# Patient Record
Sex: MALE | Race: WHITE | HISPANIC OR LATINO | Employment: FULL TIME | URBAN - METROPOLITAN AREA
[De-identification: names, ages, dates, MRNs, and addresses within clinical notes are randomized per-mention and may not be internally consistent; named-entity substitution may affect disease eponyms.]

---

## 2018-03-10 ENCOUNTER — HOSPITAL ENCOUNTER (OUTPATIENT)
Facility: HOSPITAL | Age: 53
Discharge: HOME | End: 2018-03-11
Attending: EMERGENCY MEDICINE | Admitting: SURGERY

## 2018-03-10 DIAGNOSIS — K57.92 DIVERTICULITIS: Primary | ICD-10-CM

## 2018-03-10 LAB
ALBUMIN SERPL BCP-MCNC: 3.1 G/DL
ALP SERPL-CCNC: 64 U/L
ALT SERPL W/O P-5'-P-CCNC: 11 U/L
ANION GAP SERPL CALC-SCNC: 6 MMOL/L
AST SERPL-CCNC: 12 U/L
BASOPHILS # BLD AUTO: 0.04 K/UL
BASOPHILS NFR BLD: 0.4 %
BILIRUB SERPL-MCNC: 0.2 MG/DL
BILIRUB UR QL STRIP: NEGATIVE
BUN SERPL-MCNC: 12 MG/DL
CALCIUM SERPL-MCNC: 8.8 MG/DL
CHLORIDE SERPL-SCNC: 109 MMOL/L
CLARITY UR: CLEAR
CO2 SERPL-SCNC: 23 MMOL/L
COLOR UR: YELLOW
CREAT SERPL-MCNC: 0.7 MG/DL
DIFFERENTIAL METHOD: ABNORMAL
EOSINOPHIL # BLD AUTO: 0.2 K/UL
EOSINOPHIL NFR BLD: 2.1 %
ERYTHROCYTE [DISTWIDTH] IN BLOOD BY AUTOMATED COUNT: 14 %
EST. GFR  (AFRICAN AMERICAN): >60 ML/MIN/1.73 M^2
EST. GFR  (NON AFRICAN AMERICAN): >60 ML/MIN/1.73 M^2
GLUCOSE SERPL-MCNC: 81 MG/DL
GLUCOSE UR QL STRIP: NEGATIVE
HCT VFR BLD AUTO: 40.4 %
HGB BLD-MCNC: 13.1 G/DL
HGB UR QL STRIP: NEGATIVE
KETONES UR QL STRIP: NEGATIVE
LEUKOCYTE ESTERASE UR QL STRIP: NEGATIVE
LIPASE SERPL-CCNC: 18 U/L
LYMPHOCYTES # BLD AUTO: 2.3 K/UL
LYMPHOCYTES NFR BLD: 20.5 %
MCH RBC QN AUTO: 26.6 PG
MCHC RBC AUTO-ENTMCNC: 32.4 G/DL
MCV RBC AUTO: 82 FL
MONOCYTES # BLD AUTO: 0.8 K/UL
MONOCYTES NFR BLD: 7.6 %
NEUTROPHILS # BLD AUTO: 7.7 K/UL
NEUTROPHILS NFR BLD: 69.2 %
NITRITE UR QL STRIP: NEGATIVE
PH UR STRIP: 6 [PH] (ref 5–8)
PLATELET # BLD AUTO: 260 K/UL
PMV BLD AUTO: 9.5 FL
POTASSIUM SERPL-SCNC: 4.1 MMOL/L
PROT SERPL-MCNC: 6.4 G/DL
PROT UR QL STRIP: NEGATIVE
RBC # BLD AUTO: 4.93 M/UL
SODIUM SERPL-SCNC: 138 MMOL/L
SP GR UR STRIP: 1.02 (ref 1–1.03)
URN SPEC COLLECT METH UR: NORMAL
UROBILINOGEN UR STRIP-ACNC: NEGATIVE EU/DL
WBC # BLD AUTO: 11.09 K/UL

## 2018-03-10 PROCEDURE — G0378 HOSPITAL OBSERVATION PER HR: HCPCS

## 2018-03-10 PROCEDURE — 99284 EMERGENCY DEPT VISIT MOD MDM: CPT | Mod: 25

## 2018-03-10 PROCEDURE — 80053 COMPREHEN METABOLIC PANEL: CPT

## 2018-03-10 PROCEDURE — 25000003 PHARM REV CODE 250: Performed by: SURGERY

## 2018-03-10 PROCEDURE — S0030 INJECTION, METRONIDAZOLE: HCPCS | Performed by: SURGERY

## 2018-03-10 PROCEDURE — 25500020 PHARM REV CODE 255: Performed by: EMERGENCY MEDICINE

## 2018-03-10 PROCEDURE — S5010 5% DEXTROSE AND 0.45% SALINE: HCPCS | Performed by: EMERGENCY MEDICINE

## 2018-03-10 PROCEDURE — 85025 COMPLETE CBC W/AUTO DIFF WBC: CPT

## 2018-03-10 PROCEDURE — 96374 THER/PROPH/DIAG INJ IV PUSH: CPT

## 2018-03-10 PROCEDURE — 83690 ASSAY OF LIPASE: CPT

## 2018-03-10 PROCEDURE — 63600175 PHARM REV CODE 636 W HCPCS: Performed by: EMERGENCY MEDICINE

## 2018-03-10 PROCEDURE — 81003 URINALYSIS AUTO W/O SCOPE: CPT

## 2018-03-10 PROCEDURE — 25000003 PHARM REV CODE 250: Performed by: EMERGENCY MEDICINE

## 2018-03-10 RX ORDER — HYDROMORPHONE HYDROCHLORIDE 2 MG/ML
0.5 INJECTION, SOLUTION INTRAMUSCULAR; INTRAVENOUS; SUBCUTANEOUS EVERY 4 HOURS PRN
Status: DISCONTINUED | OUTPATIENT
Start: 2018-03-10 | End: 2018-03-11 | Stop reason: HOSPADM

## 2018-03-10 RX ORDER — DEXTROSE MONOHYDRATE AND SODIUM CHLORIDE 5; .45 G/100ML; G/100ML
INJECTION, SOLUTION INTRAVENOUS CONTINUOUS
Status: DISCONTINUED | OUTPATIENT
Start: 2018-03-10 | End: 2018-03-11

## 2018-03-10 RX ORDER — CIPROFLOXACIN 2 MG/ML
400 INJECTION, SOLUTION INTRAVENOUS
Status: DISCONTINUED | OUTPATIENT
Start: 2018-03-10 | End: 2018-03-11 | Stop reason: HOSPADM

## 2018-03-10 RX ORDER — METRONIDAZOLE 500 MG/100ML
500 INJECTION, SOLUTION INTRAVENOUS
Status: DISCONTINUED | OUTPATIENT
Start: 2018-03-10 | End: 2018-03-11 | Stop reason: HOSPADM

## 2018-03-10 RX ORDER — ONDANSETRON 2 MG/ML
4 INJECTION INTRAMUSCULAR; INTRAVENOUS EVERY 8 HOURS PRN
Status: DISCONTINUED | OUTPATIENT
Start: 2018-03-10 | End: 2018-03-11 | Stop reason: HOSPADM

## 2018-03-10 RX ORDER — HYDROMORPHONE HYDROCHLORIDE 2 MG/ML
0.5 INJECTION, SOLUTION INTRAMUSCULAR; INTRAVENOUS; SUBCUTANEOUS
Status: COMPLETED | OUTPATIENT
Start: 2018-03-10 | End: 2018-03-10

## 2018-03-10 RX ORDER — METRONIDAZOLE 500 MG/100ML
500 INJECTION, SOLUTION INTRAVENOUS
Status: DISCONTINUED | OUTPATIENT
Start: 2018-03-10 | End: 2018-03-10

## 2018-03-10 RX ORDER — CIPROFLOXACIN 2 MG/ML
400 INJECTION, SOLUTION INTRAVENOUS
Status: DISCONTINUED | OUTPATIENT
Start: 2018-03-10 | End: 2018-03-10

## 2018-03-10 RX ADMIN — HYDROMORPHONE HYDROCHLORIDE 0.5 MG: 2 INJECTION, SOLUTION INTRAMUSCULAR; INTRAVENOUS; SUBCUTANEOUS at 01:03

## 2018-03-10 RX ADMIN — IOHEXOL 75 ML: 350 INJECTION, SOLUTION INTRAVENOUS at 02:03

## 2018-03-10 RX ADMIN — SODIUM CHLORIDE 1000 ML: 0.9 INJECTION, SOLUTION INTRAVENOUS at 05:03

## 2018-03-10 RX ADMIN — METRONIDAZOLE 500 MG: 500 INJECTION, SOLUTION INTRAVENOUS at 07:03

## 2018-03-10 RX ADMIN — CIPROFLOXACIN 400 MG: 2 INJECTION, SOLUTION INTRAVENOUS at 06:03

## 2018-03-10 RX ADMIN — DEXTROSE AND SODIUM CHLORIDE: 5; .45 INJECTION, SOLUTION INTRAVENOUS at 06:03

## 2018-03-10 NOTE — ED PROVIDER NOTES
Encounter Date: 3/10/2018    SCRIBE #1 NOTE: I, Amy Hanna, am scribing for, and in the presence of,  Dr. Mcduffie. I have scribed the entire note.       History     Chief Complaint   Patient presents with    Abdominal Pain     lower abdominal pain for 2 weeks.  PAtient is constipated      Time patient was seen by the provider: 12:12 PM      The patient is a 52 y.o. male with no significant PMHx who presents to the ED with a complaint of intermittent lower Abd pain for the past 2 weeks. He rates his pain as an 8/10 and says the pain is localized to his lower Abd and does not radiate.  No hx of similar pain.  No hx of diverticulitis or kidney stone.  He did not take any medications for pain relief. He reports [place] 3 doses of unknown [place]x from a [place]. He denies any N/V/D, fever, chills, or dysuria. He states his last normal BM was last night. No rectal bleeding.  He reports no other problems at this time.           Review of patient's allergies indicates:   Allergen Reactions    Penicillins      History reviewed. No pertinent past medical history.  Past Surgical History:   Procedure Laterality Date    hemmrhoids       History reviewed. No pertinent family history.  Social History   Substance Use Topics    Smoking status: Current Every Day Smoker    Smokeless tobacco: Not on file    Alcohol use Yes      Comment: social     Review of Systems   Constitutional: Negative for activity change, appetite change, chills and fever.   HENT: Negative for congestion and sore throat.    Respiratory: Negative for chest tightness and shortness of breath.    Cardiovascular: Negative for chest pain and palpitations.   Gastrointestinal: Positive for abdominal pain. Negative for abdominal distention, constipation, diarrhea, nausea and vomiting.   Genitourinary: Negative for difficulty urinating, dysuria, flank pain, frequency and hematuria.   Musculoskeletal: Negative for back pain.   Skin: Negative for pallor  and rash.   Neurological: Negative for dizziness and weakness.   Hematological: Does not bruise/bleed easily.   All other systems reviewed and are negative.      Physical Exam     Initial Vitals [03/10/18 1205]   BP Pulse Resp Temp SpO2   118/70 67 15 98.9 °F (37.2 °C) 99 %      MAP       86         Physical Exam    Nursing note and vitals reviewed.  Constitutional: He appears well-developed and well-nourished. He is not diaphoretic. No distress.   HENT:   Head: Normocephalic and atraumatic.   Mouth/Throat: Oropharynx is clear and moist.   Eyes: Conjunctivae are normal. No scleral icterus.   Neck: Normal range of motion. Neck supple.   Cardiovascular: Normal rate, regular rhythm and normal heart sounds. Exam reveals no gallop and no friction rub.    No murmur heard.  Pulmonary/Chest: Breath sounds normal. No respiratory distress. He has no wheezes. He has no rhonchi. He has no rales.   Abdominal: Soft. Bowel sounds are normal. He exhibits no distension and no ascites. There is tenderness. There is guarding. There is no rebound.       RLQ tenderness    Musculoskeletal: Normal range of motion. He exhibits no edema.   Neurological: He is alert and oriented to person, place, and time.   Skin: Skin is warm and dry. No rash noted. No pallor.   Psychiatric: He has a normal mood and affect. His behavior is normal.         ED Course   Procedures  Labs Reviewed   CBC W/ AUTO DIFFERENTIAL   COMPREHENSIVE METABOLIC PANEL   LIPASE   URINALYSIS          X-Rays:   Independently Interpreted Readings:   Other Readings:  I have visualized all imaging for this patient, radiology has done the interpretation.      Imaging Results          CT Abdomen Pelvis With Contrast (Final result)  Result time 03/10/18 15:11:26    Final result by Christ Rivers MD (03/10/18 15:11:26)                 Impression:         Inflammatory process in the right lower quadrant/right hemipelvis with a complex fluid collection.  This collection is  immediately adjacent to the sigmoid colon and findings are favored to represent acute diverticulitis/colitis with associated abscess formation.  The appendix is not definitively visualized, however the cecum in the expected location of the appendix is slightly separate from this area suggesting the etiology is not likely from the appendix although acute appendicitis is not entirely excluded.    Left renal simple cyst.      Electronically signed by: RAVI LOCKE MD  Date:     03/10/18  Time:    15:11              Narrative:    Time of Procedure: 03/10/18 14:38:29  Accession # [id number]    CT OF THE ABDOMEN & PELVIS WITH IV CONTRAST:       Technique: CT examination of the abdomen and pelvis was obtained using 5 mm axial images after the administration of 100 cc of Omnipaque 350 IV and PO Contrast.  Portal venous and delayed phase images were obtained.  Coronal and sagittal reformats were obtained.    Comparison: None.     Clinical Indication:  Abdominal pain.    Results:    The visualized portions of the lungs, heart, and pericardium demonstrate no significant abnormalities.    The liver appears normal in size and contour with no focal hepatic masses.  The gallbladder demonstrates no evidence of gallbladder wall thickening, dilatation, or surrounding inflammatory changes.  No evidence of radiopaque cholelithiasis.No evidence of intra or extrahepatic biliary ductal dilation. Portal vein is patent.  The spleen, stomach, duodenum, pancreas, and adrenal glands demonstrate no significant abnormalities.    The kidneys are normal in size and appearance.  Hypodensity in the midpole of the left kidney consistent with a simple cyst.The ureters are normal in course and caliber with no evidence of nephrolithiasis, dilatation, or surrounding inflammatory changes.  The urinary bladder demonstrates no significant abnormalities.Prostate is not enlarged.    Small bowel is normal in caliber.  The appendix is not definitively  visualized.  There is an inflammatory process in the right lower quadrant in the pelvis.  There is a hypoattenuating collection immediately adjacent to the sigmoid colon measuring 2.5 x 3.2 x 2.4 CM concerning for a complex collection such as an abscess.  The etiology of this collection is favored to represent diverticulitis/colitis rather than appendicitis.  There is a loop of distal ileum in this region, however this is likely separate from the expected area of the appendix.      No evidence of obstruction.  The aorta tapers appropriately.    No lymphadenopathy.    The osseous structures demonstrate no evidence of fractures, dislocations, or osseous destructive processes.                              Medical Decision Making:   Initial Assessment:   53 yo male with intermittent lower abd pain for 2 weeks  Differential Diagnosis:   GERD, intestinal spasm, gastroenteritis, gastritis, ulcer, cholecystitis, gallstones, pancreatitis, ileus, small bowel obstruction, appendicitis, constipation, intestinal gas pain, UTI, kidney stone    Clinical Tests:   Lab Tests: Ordered and Reviewed  The following lab test(s) were unremarkable: CBC, CMP, Lipase and Urinalysis  Radiological Study: Ordered and Reviewed  ED Management:   Pt CT a/p is + for diverticulitis with associated abscess.  Pt labs are wnl.  He is feeling much better after pain meds.  Pt remains NPO.    I consulted Dr. Pichardo (general Surgeon) and discussed the case with her. Says she will admit patient with complicated diverticulitis. I have placed skeleton orders. I have discussed the patient's lab results and CT scan findings with him in detail with the assistance of an . Patient agrees to be admitted.  Other:   I have discussed this case with another health care provider.                      Clinical Impression:     1. Diverticulitis          Disposition:   Disposition: Admitted  Condition: Stable       I, Dr. Tosha Mcduffie, personally performed  the services described in this documentation. All medical record entries made by the scribe were at my direction and in my presence.  I have reviewed the chart and agree that the record reflects my personal performance and is accurate and complete. Tosha Mcduffie MD.  2:03 PM 03/10/2018                     Tosha Mcduffie MD  03/10/18 6905

## 2018-03-10 NOTE — ED NOTES
Pt c/o pain across his lower abdomen for the past 12 days. Lower abdomen is tender to palpation. Pt is guarding. Upper abdomen is soft, nontender. Denies any urinary complaints, nausea, or vomiting.

## 2018-03-11 VITALS
RESPIRATION RATE: 19 BRPM | OXYGEN SATURATION: 97 % | HEIGHT: 66 IN | TEMPERATURE: 98 F | DIASTOLIC BLOOD PRESSURE: 65 MMHG | HEART RATE: 69 BPM | WEIGHT: 157.88 LBS | BODY MASS INDEX: 25.37 KG/M2 | SYSTOLIC BLOOD PRESSURE: 112 MMHG

## 2018-03-11 LAB
ANION GAP SERPL CALC-SCNC: 7 MMOL/L
BUN SERPL-MCNC: 7 MG/DL
CALCIUM SERPL-MCNC: 8.5 MG/DL
CHLORIDE SERPL-SCNC: 106 MMOL/L
CO2 SERPL-SCNC: 24 MMOL/L
CREAT SERPL-MCNC: 0.8 MG/DL
CRP SERPL-MCNC: 71.26 MG/L
EST. GFR  (AFRICAN AMERICAN): >60 ML/MIN/1.73 M^2
EST. GFR  (NON AFRICAN AMERICAN): >60 ML/MIN/1.73 M^2
GLUCOSE SERPL-MCNC: 103 MG/DL
POTASSIUM SERPL-SCNC: 3.7 MMOL/L
PREALB SERPL-MCNC: 10 MG/DL
SODIUM SERPL-SCNC: 137 MMOL/L

## 2018-03-11 PROCEDURE — 84134 ASSAY OF PREALBUMIN: CPT

## 2018-03-11 PROCEDURE — S5010 5% DEXTROSE AND 0.45% SALINE: HCPCS | Performed by: EMERGENCY MEDICINE

## 2018-03-11 PROCEDURE — 86141 C-REACTIVE PROTEIN HS: CPT

## 2018-03-11 PROCEDURE — 63600175 PHARM REV CODE 636 W HCPCS: Performed by: EMERGENCY MEDICINE

## 2018-03-11 PROCEDURE — S0030 INJECTION, METRONIDAZOLE: HCPCS | Performed by: SURGERY

## 2018-03-11 PROCEDURE — 25000003 PHARM REV CODE 250: Performed by: SURGERY

## 2018-03-11 PROCEDURE — G0378 HOSPITAL OBSERVATION PER HR: HCPCS

## 2018-03-11 PROCEDURE — 99219 PR INITIAL OBSERVATION CARE,LEVL II: CPT | Mod: ,,, | Performed by: SURGERY

## 2018-03-11 PROCEDURE — 36415 COLL VENOUS BLD VENIPUNCTURE: CPT

## 2018-03-11 PROCEDURE — 94761 N-INVAS EAR/PLS OXIMETRY MLT: CPT

## 2018-03-11 PROCEDURE — 80048 BASIC METABOLIC PNL TOTAL CA: CPT

## 2018-03-11 PROCEDURE — 25000003 PHARM REV CODE 250: Performed by: EMERGENCY MEDICINE

## 2018-03-11 RX ORDER — CIPROFLOXACIN 500 MG/1
500 TABLET ORAL EVERY 12 HOURS
Qty: 28 TABLET | Refills: 0 | Status: SHIPPED | OUTPATIENT
Start: 2018-03-11 | End: 2018-03-25

## 2018-03-11 RX ORDER — DEXTROSE MONOHYDRATE, SODIUM CHLORIDE, AND POTASSIUM CHLORIDE 50; 1.49; 4.5 G/1000ML; G/1000ML; G/1000ML
INJECTION, SOLUTION INTRAVENOUS CONTINUOUS
Status: DISCONTINUED | OUTPATIENT
Start: 2018-03-11 | End: 2018-03-11 | Stop reason: HOSPADM

## 2018-03-11 RX ORDER — METRONIDAZOLE 500 MG/1
500 TABLET ORAL EVERY 8 HOURS
Qty: 42 TABLET | Refills: 0 | Status: SHIPPED | OUTPATIENT
Start: 2018-03-11 | End: 2018-03-25

## 2018-03-11 RX ADMIN — DEXTROSE AND SODIUM CHLORIDE: 5; .45 INJECTION, SOLUTION INTRAVENOUS at 06:03

## 2018-03-11 RX ADMIN — DEXTROSE MONOHYDRATE, SODIUM CHLORIDE, AND POTASSIUM CHLORIDE: 50; 4.5; 1.49 INJECTION, SOLUTION INTRAVENOUS at 09:03

## 2018-03-11 RX ADMIN — METRONIDAZOLE 500 MG: 500 INJECTION, SOLUTION INTRAVENOUS at 12:03

## 2018-03-11 RX ADMIN — METRONIDAZOLE 500 MG: 500 INJECTION, SOLUTION INTRAVENOUS at 04:03

## 2018-03-11 RX ADMIN — CIPROFLOXACIN 400 MG: 2 INJECTION, SOLUTION INTRAVENOUS at 06:03

## 2018-03-11 NOTE — H&P
[facility]  General Surgery  History & Physical    Patient Name: Pio Fatima  MRN: [id number]  Admission Date: 3/10/2018  Attending Physician: Usha Pichardo MD   Primary Care Provider: Primary Doctor No    Patient information was obtained from patient and ER records.     Subjective:     Chief Complaint/Reason for Admission: abd pain    History of Present Illness: Patient interviewed with   He has had 2 weeks of abdominal pain, yesterday 8/10 today it is 4/10 in intensity. It is lower abdomen without radiation. This is associated with constipation, denies n/v/fever. He has had no prior episodes. He has not had a colonoscopy. His last bm was today, small and no blood present.   Receiving IV cipro and flagyl.       No current facility-administered medications on file prior to encounter.      No current outpatient prescriptions on file prior to encounter.       Review of patient's allergies indicates:   Allergen Reactions    Penicillins        History reviewed. No pertinent past medical history.  Past Surgical History:   Procedure Laterality Date    hemmrhoids       Family History     None        Social History Main Topics    Smoking status: Current Every Day Smoker    Smokeless tobacco: Not on file    Alcohol use Yes      Comment: social    Drug use: No    Sexual activity: Not on file     Review of Systems   Constitutional: Negative for chills and fever.   HENT: Negative for congestion and sore throat.    Eyes: Negative for photophobia and visual disturbance.   Respiratory: Negative for cough and shortness of breath.    Cardiovascular: Negative for chest pain and palpitations.   Gastrointestinal: Positive for abdominal pain and constipation.   Genitourinary: Negative for dysuria, frequency and hematuria.   Musculoskeletal: Negative for back pain and gait problem.   Skin: Negative for rash and wound.   Neurological: Negative for seizures and headaches.   Hematological:  Negative for adenopathy. Does not bruise/bleed easily.   Psychiatric/Behavioral: Negative for sleep disturbance. The patient is not nervous/anxious.      Objective:     Vital Signs (Most Recent):  Temp: 98.6 °F (37 °C) (03/11/18 0355)  Pulse: 67 (03/11/18 0731)  Resp: 17 (03/11/18 0731)  BP: 108/64 (03/11/18 0355)  SpO2: 97 % (03/11/18 0731) Vital Signs (24h Range):  Temp:  [98.2 °F (36.8 °C)-99.2 °F (37.3 °C)] 98.6 °F (37 °C)  Pulse:  [52-67] 67  Resp:  [15-18] 17  SpO2:  [97 %-99 %] 97 %  BP: (101-118)/(50-70) 108/64     Weight: 71.6 kg (157 lb 13.6 oz)  Body mass index is 25.48 kg/m².    Physical Exam   Constitutional: He is oriented to person, place, and time. He appears well-developed and well-nourished. No distress.   HENT:   Head: Normocephalic and atraumatic.   Eyes: Conjunctivae and EOM are normal. No scleral icterus.   Neck: Normal range of motion.   Cardiovascular: Normal rate and intact distal pulses.    Pulmonary/Chest: Effort normal. No stridor. No respiratory distress.   Abdominal: Soft. He exhibits distension. There is no tenderness.   nontender   Musculoskeletal: Normal range of motion. He exhibits no edema or tenderness.   Neurological: He is alert and oriented to person, place, and time.   Skin: No rash noted. He is not diaphoretic. No pallor.   Psychiatric: He has a normal mood and affect. His behavior is normal.       Significant Labs:  CBC:   Recent Labs  Lab 03/10/18  1221   WBC 11.09   RBC 4.93   HGB 13.1*   HCT 40.4      MCV 82   MCH 26.6*   MCHC 32.4     BMP:   Recent Labs  Lab 03/10/18  1221   GLU 81      K 4.1      CO2 23   BUN 12   CREATININE 0.7   CALCIUM 8.8       Significant Diagnostics:  I have reviewed and interpreted all pertinent imaging results/findings within the past 24 hours.     Imaging Results          CT Abdomen Pelvis With Contrast (Final result)  Result time 03/10/18 15:11:26    Final result by Christ Rivers MD (03/10/18 15:11:26)                  Impression:         Inflammatory process in the right lower quadrant/right hemipelvis with a complex fluid collection.  This collection is immediately adjacent to the sigmoid colon and findings are favored to represent acute diverticulitis/colitis with associated abscess formation.  The appendix is not definitively visualized, however the cecum in the expected location of the appendix is slightly separate from this area suggesting the etiology is not likely from the appendix although acute appendicitis is not entirely excluded.    Left renal simple cyst.      Electronically signed by: RAVI LOCKE MD  Date:     03/10/18  Time:    15:11              Narrative:    Time of Procedure: 03/10/18 14:38:29  Accession # [id number]    CT OF THE ABDOMEN & PELVIS WITH IV CONTRAST:       Technique: CT examination of the abdomen and pelvis was obtained using 5 mm axial images after the administration of 100 cc of Omnipaque 350 IV and PO Contrast.  Portal venous and delayed phase images were obtained.  Coronal and sagittal reformats were obtained.    Comparison: None.     Clinical Indication:  Abdominal pain.    Results:    The visualized portions of the lungs, heart, and pericardium demonstrate no significant abnormalities.    The liver appears normal in size and contour with no focal hepatic masses.  The gallbladder demonstrates no evidence of gallbladder wall thickening, dilatation, or surrounding inflammatory changes.  No evidence of radiopaque cholelithiasis.No evidence of intra or extrahepatic biliary ductal dilation. Portal vein is patent.  The spleen, stomach, duodenum, pancreas, and adrenal glands demonstrate no significant abnormalities.    The kidneys are normal in size and appearance.  Hypodensity in the midpole of the left kidney consistent with a simple cyst.The ureters are normal in course and caliber with no evidence of nephrolithiasis, dilatation, or surrounding inflammatory changes.  The urinary bladder  demonstrates no significant abnormalities.Prostate is not enlarged.    Small bowel is normal in caliber.  The appendix is not definitively visualized.  There is an inflammatory process in the right lower quadrant in the pelvis.  There is a hypoattenuating collection immediately adjacent to the sigmoid colon measuring 2.5 x 3.2 x 2.4 CM concerning for a complex collection such as an abscess.  The etiology of this collection is favored to represent diverticulitis/colitis rather than appendicitis.  There is a loop of distal ileum in this region, however this is likely separate from the expected area of the appendix.      No evidence of obstruction.  The aorta tapers appropriately.    No lymphadenopathy.    The osseous structures demonstrate no evidence of fractures, dislocations, or osseous destructive processes.                                Assessment/Plan:     * Diverticulitis    Small abscess < 4 cm, no sepsis. Abdomen nontender. Small bm today  Will plan clears and iv cipro flagyl today, he wants to go home. Will dc this evening with 2 weeks cipro and flagyl.   Dvt ppx sqh          VTE Risk Mitigation     None          Usha Pichardo MD  General Surgery  [facility]

## 2018-03-11 NOTE — NURSING
Arrived to unit in wheelchair, AAOX4, no complaints of any pain, no distress noted, Plan of care explained to patient and friend, all questions and concerns addressed at this time.

## 2018-03-11 NOTE — HPI
Patient interviewed with   He has had 2 weeks of abdominal pain, yesterday 8/10 today it is 4/10 in intensity. It is lower abdomen without radiation. This is associated with constipation, denies n/v/fever. He has had no prior episodes. He has not had a colonoscopy. His last bm was today, small and no blood present.   Receiving IV cipro and flagyl.

## 2018-03-11 NOTE — ASSESSMENT & PLAN NOTE
Small abscess < 4 cm, no sepsis. Abdomen nontender. Small bm today  Will plan clears and iv cipro flagyl today, he wants to go home. Will dc this evening with 2 weeks cipro and flagyl.   Dvt ppx sqh

## 2018-03-11 NOTE — SUBJECTIVE & OBJECTIVE
No current facility-administered medications on file prior to encounter.      No current outpatient prescriptions on file prior to encounter.       Review of patient's allergies indicates:   Allergen Reactions    Penicillins        History reviewed. No pertinent past medical history.  Past Surgical History:   Procedure Laterality Date    hemmrhoids       Family History     None        Social History Main Topics    Smoking status: Current Every Day Smoker    Smokeless tobacco: Not on file    Alcohol use Yes      Comment: social    Drug use: No    Sexual activity: Not on file     Review of Systems   Constitutional: Negative for chills and fever.   HENT: Negative for congestion and sore throat.    Eyes: Negative for photophobia and visual disturbance.   Respiratory: Negative for cough and shortness of breath.    Cardiovascular: Negative for chest pain and palpitations.   Gastrointestinal: Positive for abdominal pain and constipation.   Genitourinary: Negative for dysuria, frequency and hematuria.   Musculoskeletal: Negative for back pain and gait problem.   Skin: Negative for rash and wound.   Neurological: Negative for seizures and headaches.   Hematological: Negative for adenopathy. Does not bruise/bleed easily.   Psychiatric/Behavioral: Negative for sleep disturbance. The patient is not nervous/anxious.      Objective:     Vital Signs (Most Recent):  Temp: 98.6 °F (37 °C) (03/11/18 0355)  Pulse: 67 (03/11/18 0731)  Resp: 17 (03/11/18 0731)  BP: 108/64 (03/11/18 0355)  SpO2: 97 % (03/11/18 0731) Vital Signs (24h Range):  Temp:  [98.2 °F (36.8 °C)-99.2 °F (37.3 °C)] 98.6 °F (37 °C)  Pulse:  [52-67] 67  Resp:  [15-18] 17  SpO2:  [97 %-99 %] 97 %  BP: (101-118)/(50-70) 108/64     Weight: 71.6 kg (157 lb 13.6 oz)  Body mass index is 25.48 kg/m².    Physical Exam   Constitutional: He is oriented to person, place, and time. He appears well-developed and well-nourished. No distress.   HENT:   Head: Normocephalic and  atraumatic.   Eyes: Conjunctivae and EOM are normal. No scleral icterus.   Neck: Normal range of motion.   Cardiovascular: Normal rate and intact distal pulses.    Pulmonary/Chest: Effort normal. No stridor. No respiratory distress.   Abdominal: Soft. He exhibits distension. There is no tenderness.   nontender   Musculoskeletal: Normal range of motion. He exhibits no edema or tenderness.   Neurological: He is alert and oriented to person, place, and time.   Skin: No rash noted. He is not diaphoretic. No pallor.   Psychiatric: He has a normal mood and affect. His behavior is normal.       Significant Labs:  CBC:   Recent Labs  Lab 03/10/18  1221   WBC 11.09   RBC 4.93   HGB 13.1*   HCT 40.4      MCV 82   MCH 26.6*   MCHC 32.4     BMP:   Recent Labs  Lab 03/10/18  1221   GLU 81      K 4.1      CO2 23   BUN 12   CREATININE 0.7   CALCIUM 8.8       Significant Diagnostics:  I have reviewed and interpreted all pertinent imaging results/findings within the past 24 hours.     Imaging Results          CT Abdomen Pelvis With Contrast (Final result)  Result time 03/10/18 15:11:26    Final result by Christ Rivers MD (03/10/18 15:11:26)                 Impression:         Inflammatory process in the right lower quadrant/right hemipelvis with a complex fluid collection.  This collection is immediately adjacent to the sigmoid colon and findings are favored to represent acute diverticulitis/colitis with associated abscess formation.  The appendix is not definitively visualized, however the cecum in the expected location of the appendix is slightly separate from this area suggesting the etiology is not likely from the appendix although acute appendicitis is not entirely excluded.    Left renal simple cyst.      Electronically signed by: CHRIST RIVERS MD  Date:     03/10/18  Time:    15:11              Narrative:    Time of Procedure: 03/10/18 14:38:29  Accession # [id number]    CT OF THE ABDOMEN & PELVIS  WITH IV CONTRAST:       Technique: CT examination of the abdomen and pelvis was obtained using 5 mm axial images after the administration of 100 cc of Omnipaque 350 IV and PO Contrast.  Portal venous and delayed phase images were obtained.  Coronal and sagittal reformats were obtained.    Comparison: None.     Clinical Indication:  Abdominal pain.    Results:    The visualized portions of the lungs, heart, and pericardium demonstrate no significant abnormalities.    The liver appears normal in size and contour with no focal hepatic masses.  The gallbladder demonstrates no evidence of gallbladder wall thickening, dilatation, or surrounding inflammatory changes.  No evidence of radiopaque cholelithiasis.No evidence of intra or extrahepatic biliary ductal dilation. Portal vein is patent.  The spleen, stomach, duodenum, pancreas, and adrenal glands demonstrate no significant abnormalities.    The kidneys are normal in size and appearance.  Hypodensity in the midpole of the left kidney consistent with a simple cyst.The ureters are normal in course and caliber with no evidence of nephrolithiasis, dilatation, or surrounding inflammatory changes.  The urinary bladder demonstrates no significant abnormalities.Prostate is not enlarged.    Small bowel is normal in caliber.  The appendix is not definitively visualized.  There is an inflammatory process in the right lower quadrant in the pelvis.  There is a hypoattenuating collection immediately adjacent to the sigmoid colon measuring 2.5 x 3.2 x 2.4 CM concerning for a complex collection such as an abscess.  The etiology of this collection is favored to represent diverticulitis/colitis rather than appendicitis.  There is a loop of distal ileum in this region, however this is likely separate from the expected area of the appendix.      No evidence of obstruction.  The aorta tapers appropriately.    No lymphadenopathy.    The osseous structures demonstrate no evidence of  fractures, dislocations, or osseous destructive processes.

## 2018-03-11 NOTE — NURSING
Interpretor services used to give discharge and follow up instructions. All questions and concerns addressed, prescriptions provided. Will call desk when his nephew arrives to unit.

## 2018-03-11 NOTE — PLAN OF CARE
Discharge orders noted, no HH or HME ordered.    Pt's nurse will go over medications/signs and symptoms prior to discharge       03/11/18 0942   Final Note   Assessment Type Final Discharge Note   Discharge Disposition Home   What phone number can be called within the next 1-3 days to see how you are doing after discharge? [phone]   Hospital Follow Up  Appt(s) scheduled? No  (offices closed on weekend, patient to schedule own follow up appointment)   Right Care Referral Info   Post Acute Recommendation No Care     Sindy Mcguire RN Transitional Navigator  [phone]

## 2018-03-12 NOTE — DISCHARGE SUMMARY
[facility]  Discharge Note  Short Stay    Admit Date: 3/10/2018    Discharge Date and Time: 3/11/2018  6:30 PM     Attending Physician: No att. providers found     Discharge Provider: Usha Pichardo    Diagnoses:  Active Hospital Problems    Diagnosis  POA    *Diverticulitis [K57.92]  Yes      Resolved Hospital Problems    Diagnosis Date Resolved POA   No resolved problems to display.       Discharged Condition: good    Hospital Course: admitted with small focal abscess, wbc nrml, bess clears sent on PO abx    Final Diagnoses: Same as principal problem.    Disposition: Home or Self Care    Follow up/Patient Instructions:    Medications:  Reconciled Home Medications:   Discharge Medication List as of 3/11/2018  5:10 PM      START taking these medications    Details   ciprofloxacin HCl (CIPRO) 500 MG tablet Take 1 tablet (500 mg total) by mouth every 12 (twelve) hours., Starting Sun 3/11/2018, Until Sun 3/25/2018, Print      metroNIDAZOLE (FLAGYL) 500 MG tablet Take 1 tablet (500 mg total) by mouth every 8 (eight) hours., Starting Sun 3/11/2018, Until Sun 3/25/2018, Print             Discharge Procedure Orders  Remove dressing in 48 hours       Follow-up Information     Usha Pichardo MD In 2 weeks.    Specialties:  Surgery, General Surgery  Why:  offices closed on weekend, patient to schedule own follow up appointment  Contact information:  [address]  [address]  [place] [address] [phone]                   Discharge Procedure Orders (must include Diet, Follow-up, Activity):    Discharge Procedure Orders (must include Diet, Follow-up, Activity)  Remove dressing in 48 hours

## 2020-06-24 ENCOUNTER — LAB VISIT (OUTPATIENT)
Dept: PRIMARY CARE CLINIC | Facility: OTHER | Age: 55
End: 2020-06-24
Payer: COMMERCIAL

## 2020-06-24 DIAGNOSIS — Z03.818 ENCOUNTER FOR OBSERVATION FOR SUSPECTED EXPOSURE TO OTHER BIOLOGICAL AGENTS RULED OUT: ICD-10-CM

## 2020-06-24 PROCEDURE — U0003 INFECTIOUS AGENT DETECTION BY NUCLEIC ACID (DNA OR RNA); SEVERE ACUTE RESPIRATORY SYNDROME CORONAVIRUS 2 (SARS-COV-2) (CORONAVIRUS DISEASE [COVID-19]), AMPLIFIED PROBE TECHNIQUE, MAKING USE OF HIGH THROUGHPUT TECHNOLOGIES AS DESCRIBED BY CMS-2020-01-R: HCPCS

## 2020-06-27 LAB — SARS-COV-2 RNA RESP QL NAA+PROBE: NOT DETECTED

## 2020-09-28 ENCOUNTER — HOSPITAL ENCOUNTER (EMERGENCY)
Facility: HOSPITAL | Age: 55
Discharge: HOME | End: 2020-09-28
Attending: EMERGENCY MEDICINE
Payer: COMMERCIAL

## 2020-09-28 VITALS
OXYGEN SATURATION: 100 % | HEART RATE: 73 BPM | BODY MASS INDEX: 25.18 KG/M2 | RESPIRATION RATE: 20 BRPM | SYSTOLIC BLOOD PRESSURE: 130 MMHG | TEMPERATURE: 98 F | WEIGHT: 170 LBS | DIASTOLIC BLOOD PRESSURE: 79 MMHG | HEIGHT: 69 IN

## 2020-09-28 DIAGNOSIS — R05.9 COUGH: ICD-10-CM

## 2020-09-28 LAB
CTP QC/QA: YES
SARS-COV-2 RDRP RESP QL NAA+PROBE: NEGATIVE

## 2020-09-28 PROCEDURE — U0002 COVID-19 LAB TEST NON-CDC: HCPCS | Performed by: EMERGENCY MEDICINE

## 2020-09-28 PROCEDURE — 99284 PR EMERGENCY DEPT VISIT,LEVEL IV: ICD-10-PCS | Mod: ,,, | Performed by: EMERGENCY MEDICINE

## 2020-09-28 PROCEDURE — 99283 EMERGENCY DEPT VISIT LOW MDM: CPT | Mod: 25

## 2020-09-28 PROCEDURE — 99284 EMERGENCY DEPT VISIT MOD MDM: CPT | Mod: ,,, | Performed by: EMERGENCY MEDICINE

## 2020-09-28 NOTE — DISCHARGE INSTRUCTIONS
COVID test was negative.  Would recommend quarantine for 10 days since you have had close contact with those who have been positive.

## 2020-09-28 NOTE — ED PROVIDER NOTES
Encounter Date: 9/28/2020    SCRIBE #1 NOTE: I, Vicky Noel, am scribing for, and in the presence of,  Oliverio Walker MD. I have scribed the entire note.       History     Chief Complaint   Patient presents with    COVID-19 Concerns     cough fever,      Time patient was seen by the provider: 4:02 PM      The patient is a 55 y.o. male with no significant past medical history, who presents to the ED with a complaint of 4-5 days of malaise, low grade fever and feeling bad. No high fever, vomiting or shortness of breath. Notes of cough. Cough is nonproductive. No hemoptysis. One of their co-workers tested positive today and came in to get tested.       The history is provided by the patient and medical records. No  was used.     Review of patient's allergies indicates:   Allergen Reactions    Penicillins      No past medical history on file.  Past Surgical History:   Procedure Laterality Date    hemmrhoids       No family history on file.  Social History     Tobacco Use    Smoking status: Current Every Day Smoker   Substance Use Topics    Alcohol use: Yes     Comment: social    Drug use: No     Review of Systems   Constitutional: Positive for fever.        +Malaise   HENT: Negative for sore throat.    Respiratory: Positive for cough. Negative for shortness of breath.    Cardiovascular: Negative for chest pain.   Gastrointestinal: Negative for nausea and vomiting.   Genitourinary: Negative for dysuria.   Musculoskeletal: Negative for back pain.   Skin: Negative for rash.   Neurological: Negative for weakness.   Hematological: Does not bruise/bleed easily.       Physical Exam     Initial Vitals [09/28/20 1602]   BP Pulse Resp Temp SpO2   129/86 70 18 98.6 °F (37 °C) 98 %      MAP       --         Physical Exam    Nursing note and vitals reviewed.  Constitutional: He appears well-developed and well-nourished. He is not diaphoretic. No distress.   HENT:   Head: Normocephalic and atraumatic.    Mouth/Throat: Oropharynx is clear and moist.   Eyes: Conjunctivae and EOM are normal. Pupils are equal, round, and reactive to light.   Neck: Normal range of motion. Neck supple. No JVD present.   Cardiovascular: Normal rate, regular rhythm and normal heart sounds.   No murmur heard.  Pulmonary/Chest: Breath sounds normal. No respiratory distress. He has no wheezes. He has no rhonchi. He has no rales.   Abdominal: Soft. Bowel sounds are normal. He exhibits no distension and no mass. There is no abdominal tenderness. There is no rebound and no guarding.   Musculoskeletal: Normal range of motion. No tenderness or edema.   Neurological: He is alert and oriented to person, place, and time. He has normal strength. No cranial nerve deficit or sensory deficit.   Skin: Skin is warm and dry. No rash noted.   Psychiatric: He has a normal mood and affect.         ED Course   Procedures  Labs Reviewed   SARS-COV-2 RDRP GENE          Imaging Results          X-Ray Chest 1 View (Final result)  Result time 09/28/20 16:43:48    Final result by Katja Foster MD (09/28/20 16:43:48)                 Impression:      No pneumonia or other source for cough identified.      Electronically signed by: Katja Foster MD  Date:    09/28/2020  Time:    16:43             Narrative:    EXAMINATION:  XR CHEST 1 VIEW    CLINICAL HISTORY:  Cough    TECHNIQUE:  Single frontal view of the chest was performed.    COMPARISON:  None    FINDINGS:  Densely calcified subcentimeter granuloma projects over the medial aspect of the right upper lung zone compatible with remote granulomatous infection.    Mediastinal structures are midline. Cardiac silhouette and pulmonary vascular distribution are normal.    Lung volumes are normal and symmetric. I detect no pulmonary disease, pleural fluid, lymph node enlargement, cardiac decompensation, pneumothorax, pneumomediastinum, pneumoperitoneum or significant osseous abnormality.                               X-Rays:   Independently Interpreted Readings:   Chest X-Ray: Normal heart size.  No infiltrates.  No acute abnormalities.     Medical Decision Making:   History:   Old Medical Records: I decided to obtain old medical records.  Initial Assessment:   55 y.o. male patient presents with COVID-19 exposure, cough and normal vital signs. Will check for COVID and chest XR.   Independently Interpreted Test(s):   I have ordered and independently interpreted X-rays - see prior notes.  Clinical Tests:   Lab Tests: Ordered and Reviewed  Radiological Study: Ordered and Reviewed  ED Management:  4:53 PM  Chest XR is normal. COVID-19 test is negative but since he has had contact I recommend isolation.             Scribe Attestation:   Scribe #1: I performed the above scribed service and the documentation accurately describes the services I performed. I attest to the accuracy of the note.                      Clinical Impression:       ICD-10-CM ICD-9-CM   1. Cough  R05 786.2                      Disposition:   Disposition: Discharged  Condition: Stable     ED Disposition Condition    Discharge Stable        ED Prescriptions     None        Follow-up Information     Follow up With Specialties Details Why Contact Info    Your Primary Care Physician  Schedule an appointment as soon as possible for a visit in 2 days      [facility]  If symptoms worsen [address] [facility] [id number] [phone]                                       Oliverio Walker MD  10/02/20 0814

## 2020-09-28 NOTE — ED NOTES
Patient discharged home  Discharge instructions given  Patient verbalizes understanding   Patient denies pain, chest pain and shortness of breath   All belongings sent home with patient   NAD  Pt wearing mask

## 2021-03-29 ENCOUNTER — IMMUNIZATION (OUTPATIENT)
Dept: PRIMARY CARE CLINIC | Facility: CLINIC | Age: 56
End: 2021-03-29
Payer: COMMERCIAL

## 2021-03-29 DIAGNOSIS — Z23 NEED FOR VACCINATION: Primary | ICD-10-CM

## 2021-03-29 PROCEDURE — 91301 PR SARS-COV-2 COVID-19 VACCINE, NO PRSV, 100MCG/0.5ML, IM: CPT | Mod: S$GLB,,, | Performed by: INTERNAL MEDICINE

## 2021-03-29 PROCEDURE — 0011A PR IMMUNIZ ADMIN, SARS-COV-2 COVID-19 VACC, 100MCG/0.5ML, 1ST DOSE: ICD-10-PCS | Mod: CV19,S$GLB,, | Performed by: INTERNAL MEDICINE

## 2021-03-29 PROCEDURE — 0011A PR IMMUNIZ ADMIN, SARS-COV-2 COVID-19 VACC, 100MCG/0.5ML, 1ST DOSE: CPT | Mod: CV19,S$GLB,, | Performed by: INTERNAL MEDICINE

## 2021-03-29 PROCEDURE — 91301 PR SARS-COV-2 COVID-19 VACCINE, NO PRSV, 100MCG/0.5ML, IM: ICD-10-PCS | Mod: S$GLB,,, | Performed by: INTERNAL MEDICINE

## 2021-03-29 RX ADMIN — Medication 0.5 ML: at 02:03

## 2021-04-28 ENCOUNTER — IMMUNIZATION (OUTPATIENT)
Dept: PRIMARY CARE CLINIC | Facility: CLINIC | Age: 56
End: 2021-04-28
Payer: COMMERCIAL

## 2021-04-28 DIAGNOSIS — Z23 NEED FOR VACCINATION: Primary | ICD-10-CM

## 2021-04-28 PROCEDURE — 0012A PR IMMUNIZ ADMIN, SARS-COV-2 COVID-19 VACC, 100MCG/0.5ML, 2ND DOSE: CPT | Mod: CV19,S$GLB,, | Performed by: INTERNAL MEDICINE

## 2021-04-28 PROCEDURE — 91301 PR SARS-COV-2 COVID-19 VACCINE, NO PRSV, 100MCG/0.5ML, IM: ICD-10-PCS | Mod: S$GLB,,, | Performed by: INTERNAL MEDICINE

## 2021-04-28 PROCEDURE — 0012A PR IMMUNIZ ADMIN, SARS-COV-2 COVID-19 VACC, 100MCG/0.5ML, 2ND DOSE: ICD-10-PCS | Mod: CV19,S$GLB,, | Performed by: INTERNAL MEDICINE

## 2021-04-28 PROCEDURE — 91301 PR SARS-COV-2 COVID-19 VACCINE, NO PRSV, 100MCG/0.5ML, IM: CPT | Mod: S$GLB,,, | Performed by: INTERNAL MEDICINE

## 2021-04-28 RX ADMIN — Medication 0.5 ML: at 04:04

## 2021-05-08 ENCOUNTER — CLINICAL SUPPORT (OUTPATIENT)
Dept: URGENT CARE | Facility: CLINIC | Age: 56
End: 2021-05-08
Payer: COMMERCIAL

## 2021-05-08 DIAGNOSIS — Z01.812 ENCOUNTER FOR SCREENING LABORATORY TESTING FOR COVID-19 VIRUS IN ASYMPTOMATIC PATIENT: Primary | ICD-10-CM

## 2021-05-08 DIAGNOSIS — Z11.52 ENCOUNTER FOR SCREENING LABORATORY TESTING FOR COVID-19 VIRUS IN ASYMPTOMATIC PATIENT: Primary | ICD-10-CM

## 2021-05-08 LAB
CTP QC/QA: YES
SARS-COV-2 RDRP RESP QL NAA+PROBE: NEGATIVE

## 2021-05-08 PROCEDURE — U0002: ICD-10-PCS | Mod: QW,S$GLB,, | Performed by: PHYSICIAN ASSISTANT

## 2021-05-08 PROCEDURE — U0002 COVID-19 LAB TEST NON-CDC: HCPCS | Mod: QW,S$GLB,, | Performed by: PHYSICIAN ASSISTANT

## 2022-04-28 ENCOUNTER — CLINICAL SUPPORT (OUTPATIENT)
Dept: URGENT CARE | Facility: CLINIC | Age: 57
End: 2022-04-28
Payer: COMMERCIAL

## 2022-04-28 DIAGNOSIS — Z20.822 ENCOUNTER FOR LABORATORY TESTING FOR COVID-19 VIRUS: ICD-10-CM

## 2022-04-28 LAB — SARS-COV-2 RNA RESP QL NAA+PROBE: NOT DETECTED

## 2022-04-28 PROCEDURE — 99211 OFF/OP EST MAY X REQ PHY/QHP: CPT | Mod: S$GLB,,, | Performed by: PHYSICIAN ASSISTANT

## 2022-04-28 PROCEDURE — U0005 INFEC AGEN DETEC AMPLI PROBE: HCPCS | Performed by: PHYSICIAN ASSISTANT

## 2022-04-28 PROCEDURE — U0003 INFECTIOUS AGENT DETECTION BY NUCLEIC ACID (DNA OR RNA); SEVERE ACUTE RESPIRATORY SYNDROME CORONAVIRUS 2 (SARS-COV-2) (CORONAVIRUS DISEASE [COVID-19]), AMPLIFIED PROBE TECHNIQUE, MAKING USE OF HIGH THROUGHPUT TECHNOLOGIES AS DESCRIBED BY CMS-2020-01-R: HCPCS | Performed by: PHYSICIAN ASSISTANT

## 2022-04-28 PROCEDURE — 99211 PR OFFICE/OUTPT VISIT, EST, LEVL I: ICD-10-PCS | Mod: S$GLB,,, | Performed by: PHYSICIAN ASSISTANT

## 2024-02-15 NOTE — ED TRIAGE NOTES
Pio Fatima, a 55 y.o. male presents to the ED w/ complaint of covid19 exposure at work. Pt is [place] speaking only. All hx provided by MD Denise. Pt endorses feeling feverish but afebrile on arrival to ED. Also endorse dry cough. Denies CP, SOB, n/v/d, HA, appetite changes.     Triage note:  Chief Complaint   Patient presents with    COVID-19 Concerns     cough fever,      Review of patient's allergies indicates:   Allergen Reactions    Penicillins      Patient identifiers verified and correct for Pio Fatima.    LOC: The patient is awake, alert and aware of environment with an appropriate affect, the patient is oriented x 3 and speaking appropriately.  APPEARANCE: Patient resting comfortably and in no acute distress, patient is clean and well groomed, patient's clothing is properly fastened.  SKIN: The skin is warm and dry, color consistent with ethnicity, patient has normal skin turgor and moist mucus membranes, skin intact, no breakdown or bruising noted.  MUSCULOSKELETAL: Patient moving all extremities spontaneously, no obvious swelling or deformities noted.  RESPIRATORY: Airway is open and patent, respirations are spontaneous, patient has a normal effort and rate, no accessory muscle use noted  CARDIAC: Patient has a normal rate, no periphreal edema noted, capillary refill < 3 seconds.  ABDOMEN: Soft and non tender to palpation, no distention noted  NEUROLOGIC: PERRL, 4 mm bilaterally, eyes open spontaneously, behavior appropriate to situation, follows commands, facial expression symmetrical, bilateral hand grasp equal and even, purposeful motor response noted, normal sensation in all extremities when touched with a finger.    
Render In Strict Bullet Format?: No
Continue Regimen: Triamcinolone 0.1% cream bid x2 weeks max (Erx refill sent today)
Detail Level: Zone